# Patient Record
(demographics unavailable — no encounter records)

---

## 2024-10-24 NOTE — ASSESSMENT
[FreeTextEntry1] : Hypertension: Now the patient has a low blood pressure.  The patient is not taking any pharmacologic agents which reduce blood pressure.  The patient cannot stop her oxycodone because of chronic pain syndrome.  Avoidance techniques has been reviewed.  Compression garments have been recommended.  Will consider midodrine therapy if the patient remains disabled.  I have suggested neurologic consultation as some of her symptoms seem atypical for orthostatic symptoms.    Fibromyalgia: The patient feels that she needs narcotic relief.

## 2024-10-24 NOTE — REASON FOR VISIT
[FreeTextEntry1] : The patient is complaining of being lightheaded.  The patient is lightheaded and dizzy.  This occurs almost all the time.  Is been going on for about 6 weeks.  The patient's symptoms are worse when she first arises.  The patient has not been taking any antihypertensive medications.  The patient's son takes her blood pressure.  At times it is about systolic of 100.  There is been no chest pain or shortness of breath.  The patient has some symptoms as at rest as well as when she first gets up.

## 2025-04-25 NOTE — DISCUSSION/SUMMARY
[FreeTextEntry1] :  Assessment & Plan: The patient continues to experience vertigo and will be referred to an ENT specialist. 1. Dizziness/Vertigo - Refer to ENT for further evaluation. - Repeat blood work to reassess triglycerides. 2. Diabetes Management - Monitor A1C levels. 3. Hyperlipidemia - Continue current medication (Lipitor). - Re-evaluate triglyceride levels before medication adjustments.  Active Cardiovascular Issues: 1. Hypertension (ICD-10: I10) 2. Hyperlipidemia (ICD-10: E78.5) 3. Type 2 Diabetes Mellitus (ICD-10: E11.9)  Preventative Cardiology Counseling: - Patient was counseled on weight management, discussing the impact of extra weight on fatigue, sleep apnea, elevated blood pressure, and other comorbidities. Recommended DASH and Mediterranean diets with focus on plant-based, whole foods and avoiding processed foods. Target BMI goal discussed. - Patient was counseled extensively on tobacco use, explaining the risks of smoking and benefits of abstinence. Provided resources including books and internet materials. Currently, USPSTF recommends annual lung cancer screening with LDCT for adults 50-80 with a 20 pack-year smoking history who currently smoke or have quit within 15 years. Screening should stop if the patient hasn't smoked for 15 years or if a health issue limits life expectancy or the ability to have curative lung surgery.  Appreciate the opportunity to participate in the care of this patient. Strict return precautions provided. Altogether, I had the privilege of spending *** minutes on this patient's care, more than 50% of which was during a face-to-face encounter. This does not include time required to obtain/interpret an ECG or time invested in the education of medical trainees who may have participated in the patient's care.  Return to Clinic: Follow-up appointment in 6-8 weeks w/ labs   Dinesh Roy MD, [facility] [place] [facility] | Interventional Cardiology Office: [phone] | Fax [phone] [EKG obtained to assist in diagnosis and management of assessed problem(s)] : EKG obtained to assist in diagnosis and management of assessed problem(s)

## 2025-06-06 NOTE — HISTORY OF PRESENT ILLNESS
[FreeTextEntry1] : Humera Overton is a 78 year old woman with history of HTN, HLD, DM II, dizziness. They are presenting to establish CV care after prior cardiologist retired.   History of Present Illness: The patient reports persistent dizziness since 2024, which has not resolved. They describe the sensation as vertigo, feeling as though the room is spinning. In 2024, Dr. Hughes adjusted the patient's blood pressure medication, but no other interventions were noted. The patient's A1C was 7.7 in 2025, and cholesterol levels were good, with a total cholesterol of 164 and LDL of 89. Current medications include Lipitor and synthroid. The patient is advised to see an ENT specialist for further evaluation of vertigo. Repeat blood work is suggested to reassess triglycerides before making medication adjustments.  25: Lipids still not at target. A1c > 9 -- managed by PCP. Will benefit from zetia. No complaints otherwise. TSH elevation noted by PCP and patient on synthroid.   Cardiovascular Review of Systems: - Reports not having chest pain - Reports not having shortness of breath - Reports not having palpitations  - Reports not having orthopnea or PND - Reports not having dyspnea on exertion  Cardiovascular Focused Exam: - Vitals reviewed - Gen: NAD - HEENT: NCAT, EOMI - Cardiac: Regular rate and rhythm. No murmurs, rubs, gallops. Normal S1, S2. - Pulm: Clear to auscultation bilaterally, no wheezing. - Ext: Warm, well-perfused extremities. Radial pulses bilaterally 2+.  Recent Cardiovascular Testin25 - LAB - Tchol 251, . Cr 0.87, A1c 9.4  25 - ECG - Normal heart rate, no evidence of previous heart attack 21 - TTE - Ejection fraction 65-60%, valves functioning normally

## 2025-06-06 NOTE — DISCUSSION/SUMMARY
[FreeTextEntry1] : Assessment & Plan: The patient continues to experience vertigo and will be referred to an ENT specialist. 1. Dizziness/Vertigo - Referred  to ENT for further evaluation. - Repeat blood work to reassess triglycerides. 2. Diabetes Management - Monitor A1C levels. - Defer to PCP  3. Hyperlipidemia - Continue current medication (Lipitor). - Add zetia 10 mg daily  - Re-evaluate triglyceride levels before medication adjustments.  Active Cardiovascular Issues: 1. Hypertension (ICD-10: I10) 2. Hyperlipidemia (ICD-10: E78.5) 3. Type 2 Diabetes Mellitus (ICD-10: E11.9)  Preventative Cardiology Counseling: - Patient was counseled on weight management, discussing the impact of extra weight on fatigue, sleep apnea, elevated blood pressure, and other comorbidities. Recommended DASH and Mediterranean diets with focus on plant-based, whole foods and avoiding processed foods. Target BMI goal discussed. - Patient was counseled extensively on tobacco use, explaining the risks of smoking and benefits of abstinence. Provided resources including books and internet materials. Currently, USPSTF recommends annual lung cancer screening with LDCT for adults 50-80 with a 20 pack-year smoking history who currently smoke or have quit within 15 years. Screening should stop if the patient hasn't smoked for 15 years or if a health issue limits life expectancy or the ability to have curative lung surgery.  Appreciate the opportunity to participate in the care of this patient. Strict return precautions provided. Altogether, I had the privilege of spending *** minutes on this patient's care, more than 50% of which was during a face-to-face encounter. This does not include time required to obtain/interpret an ECG or time invested in the education of medical trainees who may have participated in the patient's care.  Return to Clinic: Follow-up appointment in 6-8 weeks w/ labs   Dinesh Ryo MD, [facility] Physician Practices | Interventional Cardiology Office: [phone] | Fax [phone]